# Patient Record
Sex: MALE | Race: WHITE | NOT HISPANIC OR LATINO | Employment: FULL TIME | URBAN - METROPOLITAN AREA
[De-identification: names, ages, dates, MRNs, and addresses within clinical notes are randomized per-mention and may not be internally consistent; named-entity substitution may affect disease eponyms.]

---

## 2021-07-31 ENCOUNTER — HOSPITAL ENCOUNTER (OUTPATIENT)
Facility: HOSPITAL | Age: 41
Setting detail: OBSERVATION
Discharge: HOME | End: 2021-08-02
Attending: EMERGENCY MEDICINE | Admitting: FAMILY MEDICINE
Payer: COMMERCIAL

## 2021-07-31 ENCOUNTER — APPOINTMENT (EMERGENCY)
Dept: CT IMAGING | Facility: HOSPITAL | Age: 41
End: 2021-07-31
Payer: COMMERCIAL

## 2021-07-31 ENCOUNTER — APPOINTMENT (EMERGENCY)
Dept: RADIOLOGY | Facility: HOSPITAL | Age: 41
End: 2021-07-31
Payer: COMMERCIAL

## 2021-07-31 DIAGNOSIS — S82.892A CLOSED LEFT ANKLE FRACTURE: ICD-10-CM

## 2021-07-31 DIAGNOSIS — S82.873A PILON FRACTURE: Primary | ICD-10-CM

## 2021-07-31 PROBLEM — S82.852A CLOSED DISPLACED TRIMALLEOLAR FRACTURE OF LEFT ANKLE: Status: ACTIVE | Noted: 2021-07-31

## 2021-07-31 PROBLEM — E66.3 OVERWEIGHT (BMI 25.0-29.9): Status: ACTIVE | Noted: 2021-07-31

## 2021-07-31 LAB
ABO GROUP BLD: NORMAL
ABO GROUP BLD: NORMAL
ANION GAP SERPL CALCULATED.3IONS-SCNC: 11 MMOL/L (ref 4–13)
APTT PPP: 26 SECONDS (ref 23–37)
BASOPHILS # BLD AUTO: 0 THOUSANDS/ΜL (ref 0–0.1)
BASOPHILS NFR BLD AUTO: 0 % (ref 0–2)
BLD GP AB SCN SERPL QL: NEGATIVE
BUN SERPL-MCNC: 16 MG/DL (ref 7–25)
CALCIUM SERPL-MCNC: 9.1 MG/DL (ref 8.6–10.5)
CHLORIDE SERPL-SCNC: 104 MMOL/L (ref 98–107)
CO2 SERPL-SCNC: 24 MMOL/L (ref 21–31)
CREAT SERPL-MCNC: 0.55 MG/DL (ref 0.7–1.3)
EOSINOPHIL # BLD AUTO: 0 THOUSAND/ΜL (ref 0–0.61)
EOSINOPHIL NFR BLD AUTO: 0 % (ref 0–5)
ERYTHROCYTE [DISTWIDTH] IN BLOOD BY AUTOMATED COUNT: 13.8 % (ref 11.5–14.5)
GFR SERPL CREATININE-BSD FRML MDRD: 131 ML/MIN/1.73SQ M
GLUCOSE P FAST SERPL-MCNC: 86 MG/DL (ref 65–99)
GLUCOSE SERPL-MCNC: 86 MG/DL (ref 65–99)
HCT VFR BLD AUTO: 44.2 % (ref 42–47)
HGB BLD-MCNC: 15 G/DL (ref 14–18)
INR PPP: 1.21 (ref 0.84–1.19)
LYMPHOCYTES # BLD AUTO: 1.3 THOUSANDS/ΜL (ref 0.6–4.47)
LYMPHOCYTES NFR BLD AUTO: 12 % (ref 21–51)
MCH RBC QN AUTO: 26.6 PG (ref 26–34)
MCHC RBC AUTO-ENTMCNC: 34 G/DL (ref 31–37)
MCV RBC AUTO: 78 FL (ref 81–99)
MONOCYTES # BLD AUTO: 1 THOUSAND/ΜL (ref 0.17–1.22)
MONOCYTES NFR BLD AUTO: 10 % (ref 2–12)
NEUTROPHILS # BLD AUTO: 8.4 THOUSANDS/ΜL (ref 1.4–6.5)
NEUTS SEG NFR BLD AUTO: 78 % (ref 42–75)
PLATELET # BLD AUTO: 239 THOUSANDS/UL (ref 149–390)
PMV BLD AUTO: 8.1 FL (ref 8.6–11.7)
POTASSIUM SERPL-SCNC: 3.9 MMOL/L (ref 3.5–5.5)
PROTHROMBIN TIME: 15.2 SECONDS (ref 11.6–14.5)
RBC # BLD AUTO: 5.65 MILLION/UL (ref 4.3–5.9)
RH BLD: POSITIVE
RH BLD: POSITIVE
SODIUM SERPL-SCNC: 139 MMOL/L (ref 134–143)
SPECIMEN EXPIRATION DATE: NORMAL
WBC # BLD AUTO: 10.9 THOUSAND/UL (ref 4.8–10.8)

## 2021-07-31 PROCEDURE — 86900 BLOOD TYPING SEROLOGIC ABO: CPT | Performed by: EMERGENCY MEDICINE

## 2021-07-31 PROCEDURE — 85025 COMPLETE CBC W/AUTO DIFF WBC: CPT | Performed by: EMERGENCY MEDICINE

## 2021-07-31 PROCEDURE — 36415 COLL VENOUS BLD VENIPUNCTURE: CPT | Performed by: EMERGENCY MEDICINE

## 2021-07-31 PROCEDURE — 99220 PR INITIAL OBSERVATION CARE/DAY 70 MINUTES: CPT | Performed by: FAMILY MEDICINE

## 2021-07-31 PROCEDURE — 99285 EMERGENCY DEPT VISIT HI MDM: CPT | Performed by: EMERGENCY MEDICINE

## 2021-07-31 PROCEDURE — 73700 CT LOWER EXTREMITY W/O DYE: CPT

## 2021-07-31 PROCEDURE — 85610 PROTHROMBIN TIME: CPT | Performed by: EMERGENCY MEDICINE

## 2021-07-31 PROCEDURE — 80048 BASIC METABOLIC PNL TOTAL CA: CPT | Performed by: EMERGENCY MEDICINE

## 2021-07-31 PROCEDURE — 73610 X-RAY EXAM OF ANKLE: CPT

## 2021-07-31 PROCEDURE — 85730 THROMBOPLASTIN TIME PARTIAL: CPT | Performed by: EMERGENCY MEDICINE

## 2021-07-31 PROCEDURE — 99285 EMERGENCY DEPT VISIT HI MDM: CPT

## 2021-07-31 PROCEDURE — 86850 RBC ANTIBODY SCREEN: CPT | Performed by: EMERGENCY MEDICINE

## 2021-07-31 PROCEDURE — 93005 ELECTROCARDIOGRAM TRACING: CPT

## 2021-07-31 PROCEDURE — 86901 BLOOD TYPING SEROLOGIC RH(D): CPT | Performed by: EMERGENCY MEDICINE

## 2021-07-31 RX ORDER — ONDANSETRON 2 MG/ML
4 INJECTION INTRAMUSCULAR; INTRAVENOUS EVERY 6 HOURS PRN
Status: DISCONTINUED | OUTPATIENT
Start: 2021-07-31 | End: 2021-08-02 | Stop reason: HOSPADM

## 2021-07-31 RX ORDER — HYDROMORPHONE HCL/PF 1 MG/ML
0.5 SYRINGE (ML) INJECTION ONCE
Status: DISCONTINUED | OUTPATIENT
Start: 2021-07-31 | End: 2021-08-02 | Stop reason: HOSPADM

## 2021-07-31 RX ORDER — OXYCODONE HYDROCHLORIDE 5 MG/1
5 TABLET ORAL ONCE
Status: COMPLETED | OUTPATIENT
Start: 2021-07-31 | End: 2021-07-31

## 2021-07-31 RX ORDER — HYDROMORPHONE HCL/PF 1 MG/ML
1 SYRINGE (ML) INJECTION EVERY 4 HOURS PRN
Status: DISCONTINUED | OUTPATIENT
Start: 2021-07-31 | End: 2021-08-01

## 2021-07-31 RX ADMIN — HYDROMORPHONE HYDROCHLORIDE 1 MG: 1 INJECTION, SOLUTION INTRAMUSCULAR; INTRAVENOUS; SUBCUTANEOUS at 20:47

## 2021-07-31 RX ADMIN — MORPHINE SULFATE 2 MG: 2 INJECTION, SOLUTION INTRAMUSCULAR; INTRAVENOUS at 23:12

## 2021-07-31 RX ADMIN — OXYCODONE HYDROCHLORIDE 5 MG: 5 TABLET ORAL at 18:57
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
## 2021-08-01 ENCOUNTER — ANESTHESIA (OUTPATIENT)
Dept: PERIOP | Facility: HOSPITAL | Age: 41
End: 2021-08-01
Payer: COMMERCIAL

## 2021-08-01 ENCOUNTER — ANESTHESIA EVENT (OUTPATIENT)
Dept: PERIOP | Facility: HOSPITAL | Age: 41
End: 2021-08-01
Payer: COMMERCIAL

## 2021-08-01 ENCOUNTER — APPOINTMENT (OUTPATIENT)
Dept: RADIOLOGY | Facility: HOSPITAL | Age: 41
End: 2021-08-01
Payer: COMMERCIAL

## 2021-08-01 LAB
ATRIAL RATE: 104 BPM
P AXIS: 23 DEGREES
PR INTERVAL: 198 MS
QRS AXIS: 30 DEGREES
QRSD INTERVAL: 104 MS
QT INTERVAL: 358 MS
QTC INTERVAL: 470 MS
T WAVE AXIS: 52 DEGREES
VENTRICULAR RATE: 104 BPM

## 2021-08-01 PROCEDURE — 99225 PR SBSQ OBSERVATION CARE/DAY 25 MINUTES: CPT | Performed by: PHYSICIAN ASSISTANT

## 2021-08-01 PROCEDURE — 20690 APPL UNIPLN UNI EXT FIXJ SYS: CPT | Performed by: PHYSICIAN ASSISTANT

## 2021-08-01 PROCEDURE — C1713 ANCHOR/SCREW BN/BN,TIS/BN: HCPCS | Performed by: ORTHOPAEDIC SURGERY

## 2021-08-01 PROCEDURE — 20690 APPL UNIPLN UNI EXT FIXJ SYS: CPT | Performed by: ORTHOPAEDIC SURGERY

## 2021-08-01 PROCEDURE — 93010 ELECTROCARDIOGRAM REPORT: CPT | Performed by: INTERNAL MEDICINE

## 2021-08-01 PROCEDURE — 99244 OFF/OP CNSLTJ NEW/EST MOD 40: CPT | Performed by: ORTHOPAEDIC SURGERY

## 2021-08-01 PROCEDURE — 76000 FLUOROSCOPY <1 HR PHYS/QHP: CPT

## 2021-08-01 DEVICE — ROD CARBON FIBER 11MM X 350MM: Type: IMPLANTABLE DEVICE | Status: FUNCTIONAL

## 2021-08-01 DEVICE — 5.0MM SELF-DRILLING SCHANZ SCREW 60MM THRD/175MM: Type: IMPLANTABLE DEVICE | Site: LEG | Status: FUNCTIONAL

## 2021-08-01 DEVICE — CLAMP EXFIX LRG COMBINATION MRI SAFE LRG: Type: IMPLANTABLE DEVICE | Status: FUNCTIONAL

## 2021-08-01 DEVICE — 6.0MM TRANSFIXATION PIN 225MM: Type: IMPLANTABLE DEVICE | Site: LEG | Status: FUNCTIONAL

## 2021-08-01 RX ORDER — SODIUM CHLORIDE, SODIUM LACTATE, POTASSIUM CHLORIDE, CALCIUM CHLORIDE 600; 310; 30; 20 MG/100ML; MG/100ML; MG/100ML; MG/100ML
INJECTION, SOLUTION INTRAVENOUS CONTINUOUS PRN
Status: DISCONTINUED | OUTPATIENT
Start: 2021-08-01 | End: 2021-08-01

## 2021-08-01 RX ORDER — PROPOFOL 10 MG/ML
INJECTION, EMULSION INTRAVENOUS AS NEEDED
Status: DISCONTINUED | OUTPATIENT
Start: 2021-08-01 | End: 2021-08-01

## 2021-08-01 RX ORDER — HYDROMORPHONE HCL/PF 1 MG/ML
0.5 SYRINGE (ML) INJECTION
Status: DISCONTINUED | OUTPATIENT
Start: 2021-08-01 | End: 2021-08-01 | Stop reason: HOSPADM

## 2021-08-01 RX ORDER — OXYCODONE HYDROCHLORIDE 10 MG/1
10 TABLET ORAL EVERY 4 HOURS PRN
Status: DISCONTINUED | OUTPATIENT
Start: 2021-08-01 | End: 2021-08-02 | Stop reason: HOSPADM

## 2021-08-01 RX ORDER — ONDANSETRON 2 MG/ML
INJECTION INTRAMUSCULAR; INTRAVENOUS AS NEEDED
Status: DISCONTINUED | OUTPATIENT
Start: 2021-08-01 | End: 2021-08-01

## 2021-08-01 RX ORDER — MAGNESIUM HYDROXIDE 1200 MG/15ML
LIQUID ORAL AS NEEDED
Status: DISCONTINUED | OUTPATIENT
Start: 2021-08-01 | End: 2021-08-01 | Stop reason: HOSPADM

## 2021-08-01 RX ORDER — ACETAMINOPHEN 325 MG/1
650 TABLET ORAL EVERY 6 HOURS PRN
Status: DISCONTINUED | OUTPATIENT
Start: 2021-08-01 | End: 2021-08-02 | Stop reason: HOSPADM

## 2021-08-01 RX ORDER — METOCLOPRAMIDE HYDROCHLORIDE 5 MG/ML
10 INJECTION INTRAMUSCULAR; INTRAVENOUS ONCE AS NEEDED
Status: DISCONTINUED | OUTPATIENT
Start: 2021-08-01 | End: 2021-08-01 | Stop reason: HOSPADM

## 2021-08-01 RX ORDER — CEFAZOLIN SODIUM 2 G/50ML
2000 SOLUTION INTRAVENOUS ONCE
Status: COMPLETED | OUTPATIENT
Start: 2021-08-01 | End: 2021-08-01

## 2021-08-01 RX ORDER — CHLORHEXIDINE GLUCONATE 4 G/100ML
SOLUTION TOPICAL DAILY PRN
Status: DISCONTINUED | OUTPATIENT
Start: 2021-08-01 | End: 2021-08-01 | Stop reason: HOSPADM

## 2021-08-01 RX ORDER — OXYCODONE HYDROCHLORIDE AND ACETAMINOPHEN 5; 325 MG/1; MG/1
2 TABLET ORAL EVERY 4 HOURS PRN
Status: DISCONTINUED | OUTPATIENT
Start: 2021-08-01 | End: 2021-08-01

## 2021-08-01 RX ORDER — HYDROMORPHONE HCL/PF 1 MG/ML
SYRINGE (ML) INJECTION AS NEEDED
Status: DISCONTINUED | OUTPATIENT
Start: 2021-08-01 | End: 2021-08-01

## 2021-08-01 RX ORDER — HYDROMORPHONE HCL/PF 1 MG/ML
0.5 SYRINGE (ML) INJECTION EVERY 4 HOURS PRN
Status: DISCONTINUED | OUTPATIENT
Start: 2021-08-01 | End: 2021-08-02 | Stop reason: HOSPADM

## 2021-08-01 RX ORDER — FENTANYL CITRATE 50 UG/ML
INJECTION, SOLUTION INTRAMUSCULAR; INTRAVENOUS AS NEEDED
Status: DISCONTINUED | OUTPATIENT
Start: 2021-08-01 | End: 2021-08-01

## 2021-08-01 RX ORDER — DEXMEDETOMIDINE HYDROCHLORIDE 100 UG/ML
INJECTION, SOLUTION INTRAVENOUS AS NEEDED
Status: DISCONTINUED | OUTPATIENT
Start: 2021-08-01 | End: 2021-08-01

## 2021-08-01 RX ORDER — LIDOCAINE HYDROCHLORIDE 10 MG/ML
INJECTION, SOLUTION EPIDURAL; INFILTRATION; INTRACAUDAL; PERINEURAL AS NEEDED
Status: DISCONTINUED | OUTPATIENT
Start: 2021-08-01 | End: 2021-08-01

## 2021-08-01 RX ORDER — KETAMINE HYDROCHLORIDE 50 MG/ML
INJECTION, SOLUTION, CONCENTRATE INTRAMUSCULAR; INTRAVENOUS AS NEEDED
Status: DISCONTINUED | OUTPATIENT
Start: 2021-08-01 | End: 2021-08-01

## 2021-08-01 RX ORDER — FENTANYL CITRATE/PF 50 MCG/ML
50 SYRINGE (ML) INJECTION
Status: DISCONTINUED | OUTPATIENT
Start: 2021-08-01 | End: 2021-08-01 | Stop reason: HOSPADM

## 2021-08-01 RX ORDER — CEFAZOLIN SODIUM 1 G/50ML
1000 SOLUTION INTRAVENOUS EVERY 8 HOURS
Status: COMPLETED | OUTPATIENT
Start: 2021-08-01 | End: 2021-08-02

## 2021-08-01 RX ORDER — MIDAZOLAM HYDROCHLORIDE 2 MG/2ML
INJECTION, SOLUTION INTRAMUSCULAR; INTRAVENOUS AS NEEDED
Status: DISCONTINUED | OUTPATIENT
Start: 2021-08-01 | End: 2021-08-01

## 2021-08-01 RX ORDER — OXYCODONE HYDROCHLORIDE 5 MG/1
5 TABLET ORAL EVERY 4 HOURS PRN
Status: DISCONTINUED | OUTPATIENT
Start: 2021-08-01 | End: 2021-08-02 | Stop reason: HOSPADM

## 2021-08-01 RX ADMIN — CEFAZOLIN SODIUM 2000 MG: 2 SOLUTION INTRAVENOUS at 08:33

## 2021-08-01 RX ADMIN — KETAMINE HYDROCHLORIDE 30 MG: 50 INJECTION, SOLUTION INTRAMUSCULAR; INTRAVENOUS at 08:41

## 2021-08-01 RX ADMIN — KETAMINE HYDROCHLORIDE 20 MG: 50 INJECTION, SOLUTION INTRAMUSCULAR; INTRAVENOUS at 09:02

## 2021-08-01 RX ADMIN — Medication 0.4 MCG/KG/HR: at 08:51

## 2021-08-01 RX ADMIN — FENTANYL CITRATE 50 MCG: 50 INJECTION INTRAMUSCULAR; INTRAVENOUS at 08:33

## 2021-08-01 RX ADMIN — DEXMEDETOMIDINE HYDROCHLORIDE 8 MCG: 100 INJECTION, SOLUTION INTRAVENOUS at 08:51

## 2021-08-01 RX ADMIN — FENTANYL CITRATE 50 MCG: 50 INJECTION INTRAMUSCULAR; INTRAVENOUS at 09:02

## 2021-08-01 RX ADMIN — DEXMEDETOMIDINE HYDROCHLORIDE 8 MCG: 100 INJECTION, SOLUTION INTRAVENOUS at 08:46

## 2021-08-01 RX ADMIN — OXYCODONE HYDROCHLORIDE 10 MG: 10 TABLET ORAL at 15:01

## 2021-08-01 RX ADMIN — CEFAZOLIN SODIUM 1000 MG: 1 SOLUTION INTRAVENOUS at 23:32

## 2021-08-01 RX ADMIN — FENTANYL CITRATE 50 MCG: 50 INJECTION, SOLUTION INTRAMUSCULAR; INTRAVENOUS at 10:10

## 2021-08-01 RX ADMIN — HYDROMORPHONE HYDROCHLORIDE 1 MG: 1 INJECTION, SOLUTION INTRAMUSCULAR; INTRAVENOUS; SUBCUTANEOUS at 08:38

## 2021-08-01 RX ADMIN — MIDAZOLAM HYDROCHLORIDE 2 MG: 1 INJECTION, SOLUTION INTRAMUSCULAR; INTRAVENOUS at 08:38

## 2021-08-01 RX ADMIN — OXYCODONE HYDROCHLORIDE AND ACETAMINOPHEN 2 TABLET: 5; 325 TABLET ORAL at 11:39

## 2021-08-01 RX ADMIN — FENTANYL CITRATE 50 MCG: 50 INJECTION, SOLUTION INTRAMUSCULAR; INTRAVENOUS at 10:35

## 2021-08-01 RX ADMIN — MORPHINE SULFATE 2 MG: 2 INJECTION, SOLUTION INTRAMUSCULAR; INTRAVENOUS at 03:22

## 2021-08-01 RX ADMIN — CEFAZOLIN SODIUM 1000 MG: 1 SOLUTION INTRAVENOUS at 17:12

## 2021-08-01 RX ADMIN — PROPOFOL 200 MG: 10 INJECTION, EMULSION INTRAVENOUS at 08:41

## 2021-08-01 RX ADMIN — LIDOCAINE HYDROCHLORIDE 100 MG: 10 INJECTION, SOLUTION EPIDURAL; INFILTRATION; INTRACAUDAL; PERINEURAL at 08:41

## 2021-08-01 RX ADMIN — HYDROMORPHONE HYDROCHLORIDE 1 MG: 1 INJECTION, SOLUTION INTRAMUSCULAR; INTRAVENOUS; SUBCUTANEOUS at 00:58

## 2021-08-01 RX ADMIN — SODIUM CHLORIDE, SODIUM LACTATE, POTASSIUM CHLORIDE, AND CALCIUM CHLORIDE: .6; .31; .03; .02 INJECTION, SOLUTION INTRAVENOUS at 08:33

## 2021-08-01 RX ADMIN — DEXMEDETOMIDINE HYDROCHLORIDE 8 MCG: 100 INJECTION, SOLUTION INTRAVENOUS at 09:03

## 2021-08-01 RX ADMIN — HYDROMORPHONE HYDROCHLORIDE 0.5 MG: 1 INJECTION, SOLUTION INTRAMUSCULAR; INTRAVENOUS; SUBCUTANEOUS at 17:22

## 2021-08-01 RX ADMIN — ONDANSETRON 4 MG: 2 INJECTION INTRAMUSCULAR; INTRAVENOUS at 09:17

## 2021-08-01 RX ADMIN — OXYCODONE HYDROCHLORIDE 10 MG: 10 TABLET ORAL at 20:02

## 2021-08-01 RX ADMIN — HYDROMORPHONE HYDROCHLORIDE 1 MG: 1 INJECTION, SOLUTION INTRAMUSCULAR; INTRAVENOUS; SUBCUTANEOUS at 05:33

## 2021-08-01 RX ADMIN — HYDROMORPHONE HYDROCHLORIDE 0.5 MG: 1 INJECTION, SOLUTION INTRAMUSCULAR; INTRAVENOUS; SUBCUTANEOUS at 22:32

## 2021-08-01 NOTE — NURSING NOTE
Patient returned from ICU after recovery  Patient awake and alert  Left lower extremity pink with non-pitting edema noted to toes  Patient states numbness still continues but has improved  Ice packs placed  Pain 5/10  Percocet administered  Patient eating and tolerating well  Will continue to monitor

## 2021-08-01 NOTE — NURSING NOTE
Pt remains awake, alert,and oriented x4  Pt states that pain to lle has diminished to 5/10  No nausea  Pt states he is still having mild tingling and numbness to lle but it is better  Verbal report to be given to receiving rn, and pt to be transported to [facility]
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
## 2021-08-01 NOTE — PHYSICAL THERAPY NOTE
PT cancel note       08/01/21 1157   PT Last Visit   PT Visit Date 08/01/21   Note Type   Note type Evaluation   Cancel Reasons Patient to operating room   Will continue to follow    Char Ying, PT

## 2021-08-01 NOTE — NURSING NOTE
Pt received to icu #2 for pacu recovery s/p left ankle fracture  Pt currently on 6l simple mask and o2 sat 98%  Placed on monitor and bp cuff  Pt unresponsive to painful stimuli, sternal rub, or loud verbalizations at this time  Assessment as noted in pacu charting  Lungs with b/l rhonchi  NV check to lle with +2 pulsed, skin warm and dry, and swelling noted to toes on left foot  Ice applied to same  7999  Eyes open, pt drowsy, but oriented to person, place, time, and situation  C/o 8/10 left ankle pain, surgical site  Pt medicated as ordered for same  Pt can move toes on both feet  He states he can feel me touching his toes  Pedal pulses +2 b/l  Pt states his lle feels numb and tingly, but he can feel me touch his toes  98% on simple mask

## 2021-08-01 NOTE — ANESTHESIA PREPROCEDURE EVALUATION
Procedure:  APPLICATION EXTERNAL FIXATION DEVICE LOWER EXTREMITY (Left Leg Lower)    Relevant Problems   No relevant active problems        Physical Exam    Airway    Mallampati score: I  TM Distance: >3 FB  Neck ROM: full     Dental   No notable dental hx     Cardiovascular      Pulmonary      Other Findings        Anesthesia Plan  ASA Score- 1     Anesthesia Type- general with ASA Monitors  Additional Monitors:   Airway Plan: LMA  Plan Factors-Exercise tolerance (METS): >4 METS  Chart reviewed  EKG reviewed  Existing labs reviewed  Patient summary reviewed  Patient is not a current smoker  There is medical exclusion for perioperative obstructive sleep apnea risk education  Induction- intravenous  Postoperative Plan- Plan for postoperative opioid use  Informed Consent- Anesthetic plan and risks discussed with patient  I personally reviewed this patient with the CRNA  Discussed and agreed on the Anesthesia Plan with the CRNA  Lisa Her

## 2021-08-01 NOTE — ANESTHESIA POSTPROCEDURE EVALUATION
Post-Op Assessment Note    CV Status:  Stable  Pain Score: 0    Pain management: adequate     Mental Status:  Alert and awake   Hydration Status:  Euvolemic   PONV Controlled:  Controlled   Airway Patency:  Patent      Post Op Vitals Reviewed: Yes      Staff: CRNA         No complications documented      BP  159/74   Temp   98 9   Pulse  82   Resp   20   SpO2   98
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
## 2021-08-01 NOTE — PLAN OF CARE
Problem: PAIN - ADULT  Goal: Verbalizes/displays adequate comfort level or baseline comfort level  Description: Interventions:  - Encourage patient to monitor pain and request assistance  - Assess pain using appropriate pain scale  - Administer analgesics based on type and severity of pain and evaluate response  - Implement non-pharmacological measures as appropriate and evaluate response  - Consider cultural and social influences on pain and pain management  - Notify physician/advanced practitioner if interventions unsuccessful or patient reports new pain  Outcome: Progressing     Problem: DISCHARGE PLANNING  Goal: Discharge to home or other facility with appropriate resources  Description: INTERVENTIONS:  - Identify barriers to discharge w/patient and caregiver  - Arrange for needed discharge resources and transportation as appropriate  - Identify discharge learning needs (meds, wound care, etc )  - Refer to Case Management Department for coordinating discharge planning if the patient needs post-hospital services based on physician/advanced practitioner order or complex needs related to functional status, cognitive ability, or social support system  Outcome: Progressing     Problem: MUSCULOSKELETAL - ADULT  Goal: Maintain or return mobility to safest level of function  Description: INTERVENTIONS:  - Assess patient's ability to carry out ADLs; assess patient's baseline for ADL function and identify physical deficits which impact ability to perform ADLs (bathing, care of mouth/teeth, toileting, grooming, dressing, etc )  - Assess/evaluate cause of self-care deficits   - Assess range of motion  - Assess patient's mobility  - Assess patient's need for assistive devices and provide as appropriate  - Encourage maximum independence but intervene and supervise when necessary  - Involve family in performance of ADLs  - Assess for home care needs following discharge   - Consider OT consult to assist with ADL evaluation and planning for discharge  - Provide patient education as appropriate  Outcome: Progressing  Goal: Maintain proper alignment of affected body part  Description: INTERVENTIONS:  - Support, maintain and protect limb and body alignment  - Provide patient/ family with appropriate education  Outcome: Progressing
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
## 2021-08-01 NOTE — OCCUPATIONAL THERAPY NOTE
OccupationalTherapy Cancellation Note     Patient Name: Violette Grayson  [id number] Date: 8/1/2021  Problem List  Principal Problem:    Closed displaced trimalleolar fracture of left ankle  Active Problems:    Overweight (BMI 25 0-29 9)          08/01/21 0752   OT Last Visit   OT Visit Date 08/01/21   Note Type   Note type Evaluation   Cancel Reasons Patient to operating room       OT order received and chart review performed  At this time, OT evaluation cancelled due to patient pending OR  OT will follow and evaluate as appropriate        Husam Azul MS, OTR/L

## 2021-08-01 NOTE — OP NOTE
OPERATIVE REPORT  PATIENT NAME: Hilda Lewis    :  1980  MRN: [id number]  Pt Location: [address] [place] [address] OR ROOM 03    SURGERY DATE: 2021    Surgeon(s) and Role:     * Brad Causey MD - Primary     * Jocelin Hatch PA-C - Assisting no qualified resident available, physician assistant helped medically necessary to reduce this extremely difficult peel on fracture with application of external fixator in a safe position    Preop Diagnosis:  Pilon fracture [S82 873A]    Post-Op Diagnosis Codes:     * Pilon fracture [S82 873A]    Procedure(s) (LRB):  APPLICATION EXTERNAL FIXATION DEVICE LOWER EXTREMITY (Left)    Specimen(s):  * No specimens in log *    Estimated Blood Loss:   Minimal    Drains:  * No LDAs found *    Anesthesia Type:   General    Operative Indications:  Pilon fracture [S82 873A]  Comminuted distal tibia fracture including posterior anterior and medial malleolus and comminuted distal fibular fracture    Operative Findings:  Pilon fracture with the articular surface driven up fracture of the posterior malleolus fracture with medial malleolus fracture of the lateral malleolus  Closed reduction performed with application of external fixator and ligamental taxis final check radiographs AP lateral satisfactory    Complications:   None    Procedure and Technique: All treatment options were discussed with the patient including non-operative and operative treatment options  Patient has failed non-perative treatment and has opted for surgical intervention  Risks, complications and benefits of all treatment options were discussed in detail  The risks of surgical intervention including infection, injury to vessels and nerves  risk of failure to achieve desired results, risk of need for further procedures, potential risk of loss of life and limb were discussed with the patient  Informed consent was obtained from the patient  The operative site was marked and signed  A timeout was performed prior to the procedure  The patient was re-identified ,including name, date of birth, procedure, consent form reviewed, site and laterality  Appropriate antibiotics were administered preoperatively    The Physician Assistant was present for the entire case and provided essential assistance with patient positioning, prep and draping, wound closure, sterile dressing and splint application, all under my direct supervision(there was no resident available to assist with this case)    Implant Name Type Inv  Item Serial No   Lot No  LRB No  Used Action   WILFRID PIN TRANSFIX 225 MM - [id number]  WILFRID PIN TRANSFIX 225 MM  Synthes  Left 1 Implanted   SCREW SCHANZ 5 X 175MM THRD 60MM SLF DRILL - [id number]  SCREW SCHANZ 5 X 175MM THRD 60MM SLF DRILL  Synthes  Left 2 Implanted     Patient positioned supine on the operating table with all bony problems well padded left leg was prepared and draped in sterile fashion  A calcaneal pin was inserted good position in AP and lateral views after being cognizant of the neurovascular structures  Two pins were placed in the proximal tibia    The external fixator frame was then applied fracture was reduced in AP and lateral views for the talus to be under the comminuted shattered distal tibial surface radiographs acceptable in AP and lateral views and all the external fixator components were tightened final check radiographs satisfactory   I was present for the entire procedure    Patient Disposition:  PACU     SIGNATURE: Tori Russell MD  DATE: August 1, 2021  TIME: 9:23 AM
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
## 2021-08-02 VITALS
OXYGEN SATURATION: 97 % | DIASTOLIC BLOOD PRESSURE: 73 MMHG | HEIGHT: 67 IN | SYSTOLIC BLOOD PRESSURE: 150 MMHG | RESPIRATION RATE: 18 BRPM | WEIGHT: 196.87 LBS | TEMPERATURE: 97.1 F | HEART RATE: 87 BPM | BODY MASS INDEX: 30.9 KG/M2

## 2021-08-02 LAB
ANION GAP SERPL CALCULATED.3IONS-SCNC: 9 MMOL/L (ref 4–13)
BUN SERPL-MCNC: 13 MG/DL (ref 7–25)
CALCIUM SERPL-MCNC: 9.1 MG/DL (ref 8.6–10.5)
CHLORIDE SERPL-SCNC: 99 MMOL/L (ref 98–107)
CO2 SERPL-SCNC: 28 MMOL/L (ref 21–31)
CREAT SERPL-MCNC: 0.5 MG/DL (ref 0.7–1.3)
ERYTHROCYTE [DISTWIDTH] IN BLOOD BY AUTOMATED COUNT: 13.2 % (ref 11.5–14.5)
GFR SERPL CREATININE-BSD FRML MDRD: 137 ML/MIN/1.73SQ M
GLUCOSE SERPL-MCNC: 110 MG/DL (ref 65–99)
HCT VFR BLD AUTO: 41.3 % (ref 42–47)
HGB BLD-MCNC: 14.2 G/DL (ref 14–18)
MCH RBC QN AUTO: 26.9 PG (ref 26–34)
MCHC RBC AUTO-ENTMCNC: 34.4 G/DL (ref 31–37)
MCV RBC AUTO: 78 FL (ref 81–99)
PLATELET # BLD AUTO: 214 THOUSANDS/UL (ref 149–390)
PMV BLD AUTO: 8.9 FL (ref 8.6–11.7)
POTASSIUM SERPL-SCNC: 4 MMOL/L (ref 3.5–5.5)
RBC # BLD AUTO: 5.27 MILLION/UL (ref 4.3–5.9)
SODIUM SERPL-SCNC: 136 MMOL/L (ref 134–143)
WBC # BLD AUTO: 9.7 THOUSAND/UL (ref 4.8–10.8)

## 2021-08-02 PROCEDURE — 99024 POSTOP FOLLOW-UP VISIT: CPT | Performed by: PHYSICIAN ASSISTANT

## 2021-08-02 PROCEDURE — 97166 OT EVAL MOD COMPLEX 45 MIN: CPT

## 2021-08-02 PROCEDURE — 97162 PT EVAL MOD COMPLEX 30 MIN: CPT

## 2021-08-02 PROCEDURE — 99217 PR OBSERVATION CARE DISCHARGE MANAGEMENT: CPT | Performed by: HOSPITALIST

## 2021-08-02 PROCEDURE — 80048 BASIC METABOLIC PNL TOTAL CA: CPT | Performed by: ORTHOPAEDIC SURGERY

## 2021-08-02 PROCEDURE — 85027 COMPLETE CBC AUTOMATED: CPT | Performed by: PHYSICIAN ASSISTANT

## 2021-08-02 RX ORDER — BACITRACIN, NEOMYCIN, POLYMYXIN B 400; 3.5; 5 [USP'U]/G; MG/G; [USP'U]/G
1 OINTMENT TOPICAL DAILY
Status: DISCONTINUED | OUTPATIENT
Start: 2021-08-02 | End: 2021-08-02 | Stop reason: HOSPADM

## 2021-08-02 RX ORDER — OXYCODONE HYDROCHLORIDE 10 MG/1
10 TABLET ORAL EVERY 4 HOURS PRN
Qty: 30 TABLET | Refills: 0 | Status: SHIPPED | OUTPATIENT
Start: 2021-08-02 | End: 2021-08-12

## 2021-08-02 RX ADMIN — HYDROMORPHONE HYDROCHLORIDE 0.5 MG: 1 INJECTION, SOLUTION INTRAMUSCULAR; INTRAVENOUS; SUBCUTANEOUS at 05:35

## 2021-08-02 RX ADMIN — OXYCODONE HYDROCHLORIDE 5 MG: 5 TABLET ORAL at 08:59

## 2021-08-02 RX ADMIN — OXYCODONE HYDROCHLORIDE 10 MG: 10 TABLET ORAL at 01:56

## 2021-08-02 RX ADMIN — ENOXAPARIN SODIUM 40 MG: 100 INJECTION SUBCUTANEOUS at 08:49

## 2021-08-02 RX ADMIN — HYDROMORPHONE HYDROCHLORIDE 0.5 MG: 1 INJECTION, SOLUTION INTRAMUSCULAR; INTRAVENOUS; SUBCUTANEOUS at 12:18

## 2021-08-02 RX ADMIN — BACITRACIN ZINC, NEOMYCIN SULFATE, AND POLYMYXIN B SULFATE 1 LARGE APPLICATION: 400; 3.5; 5 OINTMENT TOPICAL at 12:17
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
## 2021-08-02 NOTE — NURSING NOTE
Pt presented with moderate sanguinous discharge through the reinforced ABD and anum  Dressing changed with new ABD's and anum wrap  Will continue to monitor closely  Pt has call bell and belongings in reach

## 2021-08-02 NOTE — OCCUPATIONAL THERAPY NOTE
Occupational Therapy Evaluation      Rosemary Lawson    8/2/2021    Patient Active Problem List   Diagnosis    Closed displaced trimalleolar fracture of left ankle          08/02/21 0948   OT Last Visit   OT Visit Date 08/02/21   Note Type   Note type Evaluation   Restrictions/Precautions   Weight Bearing Precautions Per Order Yes   LLE Weight Bearing Per Order NWB   Other Precautions Fall Risk;Pain   Pain Assessment   Pain Assessment Tool 0-10   Pain Score 5   Pain Location/Orientation Orientation: Left; Location: Leg   Pain Onset/Description Onset: Ongoing;Frequency: Constant/Continuous; Descriptor: [address] Pain Intervention(s) Ambulation/increased activity;Repositioned;Elevated   Home Living   Type of Home Apartment  (2nd floor apartment)   Home Layout One level;Performs ADLs on one level; Able to live on main level with bedroom/bathroom;Stairs to enter with rails  (10-15 NIKOLE)   Bathroom Shower/Tub Tub/shower unit   Bathroom Toilet Standard   Bathroom Equipment   (no DME reported at baseline )   [address]   (no AD used at baseline )   Additional Comments Pt reports he will be staying at mother's home for 1 wk which is 1 level w/ 1 NIKOLE w/ rails and 1 additional step to get into bedroom     Prior Function   Level of [place] Independent with ADLs and functional mobility   Lives With Spouse   Receives Help From Family   ADL Assistance Independent   IADLs Independent   Falls in the last 6 months 0   Vocational Full time employment   Comments (+) driving    Lifestyle   Autonomy Patient reporting being independent with ADLs/IADLs, ambulatory with no AD and lives with wife in a one level apartment    Reciprocal Relationships wife and mother    Service to Others works full-time    ADL   Eating Assistance 7  Independent   Grooming Assistance 7  [facility] [facility]  [address] [facility]  [address]  7  Assistance 7  Independent   LB Dressing Assistance 5  Supervision/Setup   Toileting Assistance  5  Supervision/Setup   Bed Mobility   Supine to Sit 6  Modified independent   Additional items HOB elevated; Bedrails   Sit to Supine   (DNT: pt seated OOB in recliner at end of eval )   Transfers   Sit to Stand 5  Supervision   Additional items Assist x 1;Verbal cues   Stand to Sit 5  Supervision   Additional items Assist x 1; Armrests; Verbal cues   Functional Mobility   Functional Mobility 5  Supervision   Additional Comments Pt ambulated in hallway with no overt LOB or SOB  Pt able to maintain NWB status 100% of the time with walker  Additional items Standard walker   Balance   Static Sitting Normal   Dynamic Sitting Normal   Static Standing Fair +   Dynamic Standing Fair   Activity Tolerance   Activity Tolerance Patient limited by pain   Medical Staff Made Aware Pt seen as a co-eval with PT due to the patient's co-morbidities, clinically unstable presentation, and present impairments which are a regression from the patient's baseline  Nurse Made Aware OFELIA Strong made aware of outcomes    RUE Assessment   RUE Assessment WNL   LUE Assessment   LUE Assessment WNL   Hand Function   Gross Motor Coordination Functional   Fine Motor Coordination Functional   Sensation   Light Touch Partial deficits in the LLE  (acute numbness in L foot reported)   Vision-Basic Assessment   Current Vision Does not wear glasses   Cognition   Overall Cognitive Status WFL   Arousal/Participation Alert; Responsive; Cooperative   Attention Within functional limits   Orientation Level Oriented X4   Memory Within functional limits   Following Commands Follows all commands and directions without difficulty   Comments Pt agreeable to OT eval    Assessment   Prognosis Good   Assessment Pt is a 46 y o  male who was admitted to [address] [place] [address] on 7/31/2021 with Closed displaced trimalleolar fracture of left ankle   Additionally, patient is affected by the following personal factors:steps to enter environment  Orders placed for OT evaluation/treatment with up as tolerated orders  Prior to admission, Patient reporting being independent with ADLs/IADLs, ambulatory with no AD and lives with wife in a one level apartment  Upon OT evaluation, patient requires modified independent  for UB ADLs and supervision/set-up for LB ADLs  Patient presents with functional use of BUEs, with intact prehension and fine motor coordination, and symmetrical muscle strength  Patient appears to be functioning at baseline, no further Acute OT needs identified at this time to warrant OT services  D/C OT and from OT standpoint, recommendation at time of d/c would be Home with outpatient rehabilitation once cleared by ortho  Goals   Patient Goals to recover quickly    Plan   OT Frequency Eval only   Recommendation   OT Discharge Recommendation Home with outpatient rehabilitation  (as cleared by ortho )   OT - OK to Discharge Yes  (Once medically cleared)   Additional Comments  At end of eval, pt seated OOB in recliner with all needs met and call bell within reach  Additional Comments 2 The patient's raw score on the AM-PAC Daily Activity inpatient short form is 21, standardized score is 44 27, greater than 39 4  Patients at this level are likely to benefit from discharge to home  Please refer to the recommendation of the Occupational Therapist for safe discharge planning     AM-PAC Daily Activity Inpatient   Lower Body Dressing 3   Bathing 3   Toileting 3   Upper Body Dressing 4   Grooming 4   Eating 4   Daily Activity Raw Score 21   Daily Activity Standardized Score (Calc for Raw Score >=11) 44 27   AM-PAC Applied Cognition Inpatient   Following a Speech/Presentation 4   Understanding Ordinary Conversation 4   Taking Medications 4   Remembering Where Things Are Placed or Put Away 4   Remembering List of 4-5 Errands 4   Taking Care of Complicated Tasks 4   Applied Cognition Raw Score 24   Applied Cognition Standardized Score 62 21     Jody Rodriguez, OT

## 2021-08-02 NOTE — DISCHARGE INSTRUCTIONS
Pin site care daily patient might need [facility] help for this or he can do it himself  Patient needs to follow up with Dr Torres Martinez in [place] a copy of all the x-rays CT scan should be given to the patient to take for follow-up in [place]  He needs to see orthopedics a within a week for further definitive fixation and removal of external fixator    [facility]   [address] [place] [address],[address]        Pain Management   WHAT YOU NEED TO KNOW:   Pain management includes medicines and therapies to treat pain from a surgery, injury, or illness  This can help increase your appetite, sleep, and energy  It can also improve your mood and your relationships  You and your family will receive information about how to manage your pain at home  The instructions will include what to do if you have side effects as your pain is managed  It will also include how to handle prescription pain medicine safely if it is prescribed  It is important to follow all instructions so your pain is managed effectively  This will help prevent a return to the hospital   DISCHARGE INSTRUCTIONS:   Call your doctor or pain specialist if:   · You continue to have breakthrough pain (pain between times of taking your medicine)  · The medicine you are taking makes you sleepier than usual or confused  · You have pain even after you take your pain medicine  · You have a new pain or the pain seems different than before  · You have constipation from prescription pain medicine that is not helped with treatment  · You have questions or concerns about your condition or care  Medicines:  Pain medicine may be given in any of the following ways, depending on the kind of pain you have:  · Over-the-counter:      ? NSAIDs , such as ibuprofen, help decrease swelling, pain, and fever  NSAIDs can cause stomach bleeding or kidney problems in certain people   If you take blood thinner medicine, always ask if NSAIDs are safe for you  Always read the medicine label and follow directions  Do not give these medicines to children under 10months of age without direction from your child's healthcare provider  ? Acetaminophen  decreases pain and fever  It is available without a doctor's order  Ask how much to take and how often to take it  Follow directions  Read the labels of all other medicines you are using to see if they also contain acetaminophen, or ask your doctor or pharmacist  Acetaminophen can cause liver damage if not taken correctly  Do not use more than 4 grams (4,000 milligrams) total of acetaminophen in one day  ? A pain cream, gel, or patch  may be applied to your skin on painful areas  · Prescription:      ? Several kinds of prescription pain medicines  are available  An example is opioid, or narcotic, pain medicine  Prescription pain medicines may be used for short-term (acute) pain  These medicines may not be given for long-term (chronic) pain control  Ask your healthcare provider how to take your specific prescription pain medicine safely  Also, some prescription pain medicines contain acetaminophen  Do not take other medicines that contain acetaminophen without talking to your healthcare provider  Too much acetaminophen may cause liver damage  ? Muscle relaxers  help decrease pain and muscle spasms  ? Steroids  decrease inflammation that causes pain  ? Anesthetic  medicines may be injected in or around a nerve to block pain signals from the nerves  ? Anxiety medicine  decreases anxiety  High levels of anxiety make pain harder to manage  ? Antidepressants  may be used to help decrease or prevent the symptoms of depression or anxiety  They are also used to treat nerve pain  ? Anticonvulsants  are usually used to control seizures  They may also be used to decrease chronic pain  · Take your medicine as directed    Contact your healthcare provider if you think your medicine is not helping or if you have side effects  Tell him or her if you are allergic to any medicine  Keep a list of the medicines, vitamins, and herbs you take  Include the amounts, and when and why you take them  Bring the list or the pill bottles to follow-up visits  Carry your medicine list with you in case of an emergency  Prescription pain medicine safety:   · Do not suddenly stop taking prescription pain medicine  If you have been taking prescription pain medicine for longer than 2 weeks, a sudden stop may cause dangerous side effects  Ask your healthcare provider for more information before you stop taking your medicine  · Take your medicine as directed  Take only the amount prescribed or recommended by your healthcare provider  Too much medicine may cause breathing problems or other health issues  If you use a pain patch, be sure to remove the old patch before you place a new one  · Do not drink alcohol while you use prescription medicines  Alcohol with prescription medicines can make you sleepy and slow your breathing rate  You may stop breathing completely  · Do not drive or operate heavy machinery after you take prescription pain medicine  Prescription pain medicine can make you drowsy and make it hard to concentrate  You may injure yourself or others if you drive or operate heavy machinery while taking your medicine  · Time your medicine correctly  Take your pain medicine 30 minutes before exercise or physical therapy  This helps decrease pain to help meet your treatment goals  You may need to take medicine before you go to bed  This may help you sleep and not be woken by pain  · Watch for side effects  Some foods, alcohol, and other medicines may cause side effects when you take pain medicine  Ask your healthcare provider how to prevent these problems  · Prevent constipation  This is a common side effect of prescription pain medicine   Eat foods high in fiber, such as raw fruit, vegetables, beans, and whole-grain bread and cereal  Ask your healthcare provider how much liquid to drink each day and which liquids are best for you  Exercise and activity may also help decrease the risk for constipation  · Follow instructions for what to do with medicine you do not use  Your healthcare provider will give you instructions for how to dispose of pain medicine safely  This helps make sure no one else takes the medicine  Ways pain may be managed without medicine:   · Massage therapy helps relieve tight muscles  This may help you relax and decrease pain  · Ultrasound can help decrease pain  Ultrasound is a procedure that uses sound waves to create heat applied to muscles  · Acupuncture helps reduce pain and other symptoms  Thin needles are used to balance energy channels in the body  · Biofeedback helps your body respond differently to pain  You will learn what your breathing and heart rate are when you are relaxed  That will help you get your breathing and heart rate to those levels when you are in pain  · Electrical stimulation may be used to control pain  Transcutaneous electrical stimulations (TENS) is a portable device that attaches to your skin  It uses mild, safe electrical signals to help control pain  Spinal cord stimulation (SCS) is a procedure that uses a metal wire placed near your spinal cord to help control pain  SCS also uses mild, safe electrical signals  The SCS is placed during surgery  · Surgery and other procedures may help relieve pain  Examples include radio waves, thermal (heat), or laser therapy  Surgery may also include cutting nerves or repairing joints that are the cause of your chronic pain  What you can do to manage pain:  The following may be helpful if you have mild pain or pain between medicine doses:  · Apply heat or ice as directed  Heat also relieves muscle spasms  Ice may help prevent tissue damage   Your healthcare provider may recommend only heat or ice, or you may be told to alternate  For heat, use a heat pack, heating pad, or a warm washcloth  The temperature should not be hot enough to burn your skin  Apply heat for 20 to 30 minutes every 2 hours for as many days as directed  For ice, use an ice pack, or put crushed ice in a plastic bag  Cover it with a towel before you place it on your skin  Apply ice for 15 to 20 minutes every hour or as directed  · Elevate the painful area above the level of your heart if possible  This will help decrease swelling and pain  Prop your painful area on pillows or blankets to keep it elevated comfortably  · Apply compression with an elastic bandage or abdominal binder as directed  An elastic bandage may be used after surgery on your joint, such as your knee  An abdominal binder may be used for surgeries in your abdomen  · Use devices to help you move and decrease pain  Devices can help remove pressure from the injury and provide extra support  Assistive devices include a splint, cane, crutches, or a walker  Knee sleeves and braces help decrease pain by giving your knees extra support  Arch supports and orthotics are devices that are put in your shoes to help you stand, walk, or run correctly  · Aromatherapy uses scents to relax, relieve stress, and decrease pain  Oils, extracts, or fragrances from flowers, herbs, and trees may be used  They may be inhaled or used during massages, facials, body wraps, and baths  · Meditation teaches you how to focus inside yourself  The goal of meditation is to help you feel more calm and peaceful  · Guided imagery teaches you to imagine a picture in your mind  You learn to focus on the picture instead of your pain  It may help you learn how to change the way your body senses and responds to pain  · Music may help increase energy levels and improve your mood  It may help reduce pain by triggering your body to release endorphins  These are natural body chemicals that decrease pain  Music may be used with any of the other techniques, such as relaxation and distraction  · Self-hypnosis is a way to direct your attention to something other than your pain  For example, you might repeat a positive statement about ignoring the pain or seeing the pain in a positive way  What else you can do to manage pain:   · Keep a pain diary  A pain diary may help track pain cycles so you know when and how your pain starts and ends  Include anything that makes your pain worse or better  Bring the pain diary to follow-up visits with your healthcare provider  · Talk to your healthcare provider about your daily activities  Some activities can cause pain to become worse or make pain management less effective  Your provider can help you find ways to reduce pain  For example, you may need to change when you take your pain medicine so it is more effective during activities  · Sleep in a comfortable position  Use pillows to support painful areas  · Go to rehabilitation as directed  Rehabilitation may include physical and occupational therapy  A physical therapist teaches you exercises to help improve movement and strength, and to decrease pain  An occupational therapist teaches you skills to help with your daily activities  · Exercise to help relieve pain and increase your energy  Talk to your healthcare provider about how much exercise to get each day and which exercises are best for you  Follow up with your doctor or pain specialist as directed:  Talk to your provider about your pain management at home  Tell him or her if you are able to do more of your daily activities or if any activity still causes pain  Your provider may want to make changes to your pain medicine or refer you to a specialist  For example, an occupational therapist can help you find new ways to do your daily activities so you have less pain   Write down your questions so you remember to ask them during your visits  © Copyright [employer] 2021 Information is for End User's use only and may not be sold, redistributed or otherwise used for commercial purposes  All illustrations and images included in CareNotes® are the copyrighted property of [employer]  or Fern Salamanca  The above information is an  only  It is not intended as medical advice for individual conditions or treatments  Talk to your doctor, nurse or pharmacist before following any medical regimen to see if it is safe and effective for you  Oxycodone, Rapid Release (By mouth)   Oxycodone Hydrochloride ([id number]-done [id number])  Treats moderate to severe pain  This medicine is a narcotic pain reliever  Brand Name(s): Oxaydo, Oxy IR, Roxicodone   There may be other brand names for this medicine  When This Medicine Should Not Be Used: This medicine is not right for everyone  Do not use it if you had an allergic reaction to oxycodone, codeine, hydrocodone, dihydrocodeine, or morphine, or if you have severe lung or breathing problems, or stomach or bowel blockage (including paralytic ileus)  How to Use This Medicine:   Capsule, Liquid, Tablet  · Take your medicine as directed  Your dose may need to be changed several times to find what works best for you  An overdose can be dangerous  Follow directions carefully so you do not get too much medicine at one time  Your doctor may also give naloxone to treat an overdose  · Oral liquid: Measure the oral liquid medicine with a marked measuring spoon, oral syringe, or medicine cup  · Oxaydo® tablet: Swallow it whole with enough water to swallow it completely  Do not break, crush, chew, or dissolve it  Do not wet the tablet before you put it in your mouth  · While taking the Roxybond tablet, part of it may pass into your stool  This is normal and nothing to worry about  · This medicine should come with a Medication Guide   Ask your pharmacist for a copy if you do not have one  · Missed dose: Take a dose as soon as you remember  If it is almost time for your next dose, wait until then and take a regular dose  Do not take extra medicine to make up for a missed dose  If you miss a dose of Roxybond or Roxicodone®, skip the missed dose and go back to your regular dosing schedule  · Store the medicine in a closed container at room temperature, away from heat, moisture, and direct light  Store the medicine in a secure place to prevent others from getting it  Drop off any unused narcotic medicine at a drug take-back location right away  If you do not have a drug take-back location near you, flush any unused narcotic medicine down the toilet  Check your local drug store and clinics for take-back locations  You can also check the [employer] web site for locations  Here is the link to the FDA safe disposal of medicines website: www fda gov/drugs/resourcesforyou/consumers/buyingusingmedicinesafely/ensuringsafeuseofmedicine/safedisposalofmedicines/cbg317307 htm  Drugs and Foods to Avoid:   Ask your doctor or pharmacist before using any other medicine, including over-the-counter medicines, vitamins, and herbal products  · Do not use this medicine if you are using or have used an MAO inhibitor within the past 14 days  · Some medicines can affect how oxycodone works  Tell your doctor if you are using any of the following:  ? Amiodarone, carbamazepine, cyclobenzaprine, erythromycin, ketoconazole, metaxalone, mirtazapine, phenytoin, quinidine, rifampin, ritonavir, tramadol, trazodone  ? Benzodiazepine medicine  ? Diuretic (water pill)  ? Medicine to treat depression or mental health problems (including SNRIs, SSRIs, TCAs)  ? Medicine to treat migraine headaches  ? Phenothiazine medicine  · Tell your doctor if you use anything else that makes you sleepy  Some examples are allergy medicine, narcotic pain medicine, and alcohol   Tell your doctor if you are also using buprenorphine, butorphanol, nalbuphine, pentazocine, or a muscle relaxer  · Do not drink alcohol while you are using this medicine  Warnings While Using This Medicine:   · Tell your doctor if you are pregnant or breastfeeding, or if you have kidney disease, liver disease, heart disease, low blood pressure, lung disease or breathing problems (including asthma, COPD, sleep apnea), scoliosis, an enlarged prostate, trouble urinating or swallowing, thyroid problems, Washburn disease, gallbladder or pancreas problems, or digestion problems  Tell your doctor if you have a history of head injury, brain tumor, mental health problems, seizures, or alcohol or drug addiction  · This medicine may cause the following problems:  ? High risk of overdose, which can lead to death  ? Respiratory depression (serious breathing problem that can be life-threatening)  ? Sleep-related breathing problems (including sleep apnea, sleep-related hypoxemia)  ? Adrenal gland problems  ? Low blood pressure  ? Serotonin syndrome, when used with certain medicines  ? Seizures  · This medicine may make you dizzy, drowsy, or lightheaded  Do not drive or do anything else that could be dangerous until you know how this medicine affects you  Sit or lie down if you feel dizzy  Stand up carefully  · This medicine can be habit-forming  Do not use more than your prescribed dose  Call your doctor if you think your medicine is not working  · Do not stop using this medicine suddenly  Your doctor will need to slowly decrease your dose before you stop it completely  · This medicine may cause constipation, especially with long-term use  Ask your doctor if you should use a laxative to prevent and treat constipation  Drink plenty of liquids to help avoid constipation  · This medicine could cause infertility  Talk with your doctor before using this medicine if you plan to have children  · Keep all medicine out of the reach of children   Never share your medicine with anyone  Possible Side Effects While Using This Medicine:   Call your doctor right away if you notice any of these side effects:  · Allergic reaction: Itching or hives, swelling in your face or hands, swelling or tingling in your mouth or throat, chest tightness, trouble breathing  · Anxiety, restlessness, fast heartbeat, fever, sweating, muscle spasms, twitching, nausea, vomiting, diarrhea, seeing or hearing things that are not there  · Blue lips, fingernails, or skin, trouble breathing  · Changes in skin color, dark freckles, cold feeling, tiredness, weight loss  · Extreme dizziness or weakness, shallow breathing, slow heartbeat, sweating, cold or clammy skin, seizures  · Lightheadedness, dizziness, fainting  If you notice these less serious side effects, talk with your doctor:   · Constipation, stomach pain  · Sleepiness  If you notice other side effects that you think are caused by this medicine, tell your doctor  Call your doctor for medical advice about side effects  You may report side effects to FDA at 9-279-FDA-2242  © Copyright [address] 2021 Information is for End User's use only and may not be sold, redistributed or otherwise used for commercial purposes  The above information is an  only  It is not intended as medical advice for individual conditions or treatments  Talk to your doctor, nurse or pharmacist before following any medical regimen to see if it is safe and effective for you
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
## 2021-08-02 NOTE — NURSING NOTE
Van callejas was in to see and eval the pt, ok to do pin care, aru nurses down to assist with the pin care as well as teach the pt to do his own care at home, refused hhc, pt tolerated well and verbalized understanding, extra supplies sent with the pt to assist with the care, dr Qureshi Parents in to see and eval the pt and he is ok for dc to home, iv site removed with the tip intact, avs reviewed with the pt, all questions answered to his satisfaction, pt assisted into a wc, taken to family car and was dcd from the hospital

## 2021-08-02 NOTE — PLAN OF CARE
Problem: PAIN - ADULT  Goal: Verbalizes/displays adequate comfort level or baseline comfort level  Description: Interventions:  - Encourage patient to monitor pain and request assistance  - Assess pain using appropriate pain scale  - Administer analgesics based on type and severity of pain and evaluate response  - Implement non-pharmacological measures as appropriate and evaluate response  - Consider cultural and social influences on pain and pain management  - Notify physician/advanced practitioner if interventions unsuccessful or patient reports new pain  8/2/2021 1249 by Tabitha Mcgraw RN  Outcome: Adequate for Discharge  8/2/2021 0734 by Tabitha Mcgraw RN  Outcome: Progressing  8/2/2021 0734 by Tabitha Mcgraw RN  Outcome: Progressing     Problem: DISCHARGE PLANNING  Goal: Discharge to home or other facility with appropriate resources  Description: INTERVENTIONS:  - Identify barriers to discharge w/patient and caregiver  - Arrange for needed discharge resources and transportation as appropriate  - Identify discharge learning needs (meds, wound care, etc )  - Refer to Case Management Department for coordinating discharge planning if the patient needs post-hospital services based on physician/advanced practitioner order or complex needs related to functional status, cognitive ability, or social support system  8/2/2021 1249 by Tabitha Mcgraw RN  Outcome: Adequate for Discharge  8/2/2021 0734 by Tabitha Mcgraw RN  Outcome: Progressing  8/2/2021 0734 by Tabitha Mcgraw RN  Outcome: Progressing     Problem: MUSCULOSKELETAL - ADULT  Goal: Maintain or return mobility to safest level of function  Description: INTERVENTIONS:  - Assess patient's ability to carry out ADLs; assess patient's baseline for ADL function and identify physical deficits which impact ability to perform ADLs (bathing, care of mouth/teeth, toileting, grooming, dressing, etc )  - Assess/evaluate cause of self-care deficits   - Assess range of motion  - Assess patient's mobility  - Assess patient's need for assistive devices and provide as appropriate  - Encourage maximum independence but intervene and supervise when necessary  - Involve family in performance of ADLs  - Assess for home care needs following discharge   - Consider OT consult to assist with ADL evaluation and planning for discharge  - Provide patient education as appropriate  8/2/2021 1249 by Tabitha Mcgraw RN  Outcome: Adequate for Discharge  8/2/2021 0734 by Tabitha Mcgraw RN  Outcome: Progressing  8/2/2021 0734 by Tabitha Mcgraw RN  Outcome: Progressing  Goal: Maintain proper alignment of affected body part  Description: INTERVENTIONS:  - Support, maintain and protect limb and body alignment  - Provide patient/ family with appropriate education  8/2/2021 1249 by Tabitha Mcgraw RN  Outcome: Adequate for Discharge  8/2/2021 0734 by Tabitha Mcgraw RN  Outcome: Progressing  8/2/2021 0734 by Tabitha Mcgraw RN  Outcome: Progressing     Problem: MOBILITY - ADULT  Goal: Maintain or return to baseline ADL function  Description: INTERVENTIONS:  -  Assess patient's ability to carry out ADLs; assess patient's baseline for ADL function and identify physical deficits which impact ability to perform ADLs (bathing, care of mouth/teeth, toileting, grooming, dressing, etc )  - Assess/evaluate cause of self-care deficits   - Assess range of motion  - Assess patient's mobility; develop plan if impaired  - Assess patient's need for assistive devices and provide as appropriate  - Encourage maximum independence but intervene and supervise when necessary  - Involve family in performance of ADLs  - Assess for home care needs following discharge   - Consider OT consult to assist with ADL evaluation and planning for discharge  - Provide patient education as appropriate  8/2/2021 1249 by Tabitha Mcgraw RN  Outcome: Adequate for Discharge  8/2/2021 0734 by Tabitha Mcgraw RN  Outcome: Progressing  8/2/2021 0734 by Hilda Guzman RN  Outcome: Progressing  Goal: Maintains/Returns to pre admission functional level  Description: INTERVENTIONS:  - Perform BMAT or MOVE assessment daily    - Set and communicate daily mobility goal to care team and patient/family/caregiver  - Collaborate with rehabilitation services on mobility goals if consulted  - Perform Range of Motion 3 times a day  - Reposition patient every 2 hours    - Dangle patient 3 times a day  - Stand patient 3 times a day  - Ambulate patient 3 times a day  - Out of bed to chair 3 times a day   - Out of bed for meals 3 times a day  - Out of bed for toileting  - Record patient progress and toleration of activity level   8/2/2021 1249 by Hilda Guzman RN  Outcome: Adequate for Discharge  8/2/2021 0734 by Hilda Guzman RN  Outcome: Progressing  8/2/2021 0734 by Hilda Guzman RN  Outcome: Progressing     Problem: Potential for Falls  Goal: Patient will remain free of falls  Description: INTERVENTIONS:  - Educate patient/family on patient safety including physical limitations  - Instruct patient to call for assistance with activity   - Consult OT/PT to assist with strengthening/mobility   - Keep Call bell within reach  - Keep bed low and locked with side rails adjusted as appropriate  - Keep care items and personal belongings within reach  - Initiate and maintain comfort rounds  - Make Fall Risk Sign visible to staff  - Offer Toileting every 1 Hours, in advance of need  - Initiate/Maintain bed alarm  - Obtain necessary fall risk management equipment: bed alarm  - Apply yellow socks and bracelet for high fall risk patients  - Consider moving patient to room near nurses station  8/2/2021 1249 by Hilda Guzman RN  Outcome: Adequate for Discharge  8/2/2021 0734 by Hilda Guzman RN  Outcome: Progressing  8/2/2021 0734 by Hilda Guzman RN  Outcome: Progressing

## 2021-08-02 NOTE — PROGRESS NOTES
Progress Note - Orthopedics   Wilman Mandujano 46 y o  male MRN: [id number]  Unit/Bed#: -01 Encounter: [id number]    Assessment:  POD 1 s/p external fixation device left ankle    Plan:  Continue nonweightbearing status  Dressing was reinforced overnight, now clean and dry  Will need follow-up in the [place] area[place] for [place] surgical intervention  Pain control  DC planning    Weight bearing:  Nonweightbearing left lower extremity    VTE Pharmacologic Prophylaxis: Enoxaparin (Lovenox)  VTE Mechanical Prophylaxis: sequential compression device    Subjective: The patient's dressing was soaked overnight  It was later reinforced  Dressing found to be clean, dry and intact this morning  Vitals: Blood pressure 158/72, pulse 82, temperature (!) 97 2 °F (36 2 °C), temperature source Temporal, resp  rate 18, height 5' 7" (1 702 m), weight 89 3 kg (196 lb 13 9 oz), SpO2 95 %  ,Body mass index is 30 83 kg/m²        Intake/Output Summary (Last 24 hours) at 8/2/2021 0847  Last data filed at 8/2/2021 3088  Gross per 24 hour   Intake 1435 ml   Output --   Net 1435 ml       Invasive Devices     Peripheral Intravenous Line            Peripheral IV 07/31/21 Left Antecubital 1 day                Physical Exam:   Left lower extremity is neurovascularly intact  Toes are pink and mobile  Compartments are soft  External fixator in place  Brisk cap refill  Sensation intact  Negative Homans  Dressing clean, dry and intact  Lab, Imaging and other studies:   CBC:   Lab Results   Component Value Date    WBC 9 70 08/02/2021    HGB 14 2 08/02/2021    HCT 41 3 (L) 08/02/2021    MCV 78 (L) 08/02/2021     08/02/2021    MCH 26 9 08/02/2021    MCHC 34 4 08/02/2021    RDW 13 2 08/02/2021    MPV 8 9 08/02/2021     CMP:   Lab Results   Component Value Date    SODIUM 136 08/02/2021    CL 99 08/02/2021    CO2 28 08/02/2021    BUN 13 08/02/2021    CREATININE 0 50 (L) 08/02/2021    CALCIUM 9 1 08/02/2021 EGFR 137 08/02/2021

## 2021-08-02 NOTE — DISCHARGE INSTR - AVS FIRST PAGE
Dear Mireille Eid,     It was our pleasure to care for you here at [facility]  It is our hope that we were always able to exceed the expected standards for your care during your stay  You were hospitalized due to an ankle fracture  You were cared for on the medical/surgical floor by Belgica Harper MD with the [facility] who covers for your primary care physician (PCP), No primary care provider on file  , while you were hospitalized  You were additionally seen by Dr Shannan Licea - orthopedic surgery  If you have any questions or concerns related to this hospitalization, you may contact us at [phone]  For follow up as well as any medication refills, we recommend that you follow up with your primary care physician  A registered nurse will reach out to you by phone within a few days after your discharge to answer any additional questions that you may have after going home  However, at this time we provide for you here, the most important instructions / recommendations at discharge:     · Notable Medication Adjustments -   · Please take aspirin 325 mg (available over-the-counter) twice a day for 30 days  · Prescription has been called in for Roxicodone - narcotic based pain pill  · Testing Required after Discharge -   · To be further determined in the outpatient setting by Orthopedic surgery and or your primary care provider  · Important follow up information -   · Please follow-up with the providers as outlined in this discharge package  · Other Instructions -   · Nonweightbearing to the left lower extremity until cleared by Orthopedic surgery  · Please review this entire after visit summary as additional general instructions including medication list, appointments, activity, diet, any pertinent wound care, and other additional recommendations from your care team that may be provided for you        Sincerely,     Tari Leon Valencia Alvarenga MD

## 2021-08-02 NOTE — PLAN OF CARE
Problem: PHYSICAL THERAPY ADULT  Goal: Performs mobility at highest level of function for planned discharge setting  See evaluation for individualized goals  Description: Treatment/Interventions: Functional transfer training, LE strengthening/ROM, Therapeutic exercise, Endurance training, Patient/family training, Equipment eval/education, Bed mobility, Gait training, Spoke to nursing, Spoke to case management, OT  Equipment Recommended: [place] Letters       See flowsheet documentation for full assessment, interventions and recommendations  Note: Prognosis: Good  Problem List: Decreased strength, Decreased endurance, Impaired balance, Decreased mobility, Decreased coordination, Decreased safety awareness, Obesity, Decreased skin integrity, Orthopedic restrictions, Pain  Assessment: Pt is a 47 y/o male who presents w/ chief c/o closed displaced trimalleolar fracture of L ankle and was admitted to [address] [place] [address] on 7/31/21  Pt PMH includes obesity  Pt reports living in a 2nd floor apartment w/ 10-15 NIKOLE w/ rails  He has a tub w/ shower and standard toilet w/ no DME reported at baseline and no AD used at baseline  He reports he will be staying at his mother's home for 1 wk which is 1 level home w/ 1 NIKOLE w/ rails plus an additional step to get into the bedroom  Pt reports he lives w/ his wife and is independent w/ ADLs and IADLs  He states he still drives, has had 0 falls in the last 6 months, and works full-time  Upon IE, pt AOx4, 5/10 pain in L ankle, and strength in RLE 4-/5 w/ LLE being 3/5 grossly assessed  Pt was able to transfer supine>sit w/ modified independence using the bedrails  He was then able to transfer sit<>stand w/ supervision Ax1 w/ standard walker  He was able to ambulate 40 ft total w/ supervision Ax1 w/ standard walker  Pt was then able to negotiate 6 stairs using both handrails w/ supervision Ax1 w/o AD using a hop pattern for gait   His static sitting balance is normal w/ good dynamic sitting balance  His static standing balance is fair + w/ fair dynamic standing and ambulatory balances  PT Discharge Recommendation: Home with outpatient rehabilitation     PT - OK to Discharge: Yes (when medically stable)    See flowsheet documentation for full assessment

## 2021-08-02 NOTE — PHYSICAL THERAPY NOTE
Physical Therapy Evaluation     Patient's Name: Tc Faust    Admitting Diagnosis  Left ankle injury [Z70 185Y]  Pilon fracture [C52 163A]  Closed left ankle fracture [O25 862A]    Problem List  Patient Active Problem List   Diagnosis    Closed displaced trimalleolar fracture of left ankle       Past Medical History  History reviewed  No pertinent past medical history  Past Surgical History  History reviewed  No pertinent surgical history  08/02/21 0911   PT Last Visit   PT Visit Date 08/02/21   Note Type   Note type Evaluation   Pain Assessment   Pain Assessment Tool 0-10   Pain Score 5   Pain Location/Orientation Orientation: Left; Location: Leg   Pain Onset/Description Onset: Ongoing;Frequency: Constant/Continuous; Descriptor: Aching   Effect of Pain on Daily Activities yes   Patient's Stated Pain Goal No pain   Hospital Pain Intervention(s) Medication (See MAR); Repositioned; Ambulation/increased activity   Multiple Pain Sites No   Home Living   Type of Home Apartment  (2nd floor apartment)   Home Layout One level;Performs ADLs on one level; Able to live on main level with bedroom/bathroom;Stairs to enter with rails  (10-15 NIKOLE)   Bathroom Shower/Tub Tub/shower unit   Bathroom Toilet Standard   Bathroom Equipment   (no DME reported)   [address]   (no AD used at baseline)   Additional Comments Pt reports he will be staying at mother's home for 1 wk which is 1 level w/ 1 NIKOLE w/ rails and 1 additional step to get into bedroom     Prior Function   Level of [place] Independent with ADLs and functional mobility   Lives With Spouse   Receives Help From Family   ADL Assistance Independent   IADLs Independent   Falls in the last 6 months 0   Vocational Full time employment   Comments (+) driving   Restrictions/Precautions   Weight Bearing Precautions Per Order Yes   LLE Weight Bearing Per Order NWB   Braces or Orthoses Other (Comment)  (External fixator w/ Ace wrap)   Other Precautions Fall Risk;Pain   General   Family/Caregiver Present No   Cognition   Overall Cognitive Status WFL   Arousal/Participation Responsive   Attention Within functional limits   Orientation Level Oriented X4   Memory Within functional limits   Following Commands Follows all commands and directions without difficulty   Comments Pt agreeable to PT eval, pleasant  RLE Assessment   RLE Assessment X  (4-/5 grossly assessed)   LLE Assessment   LLE Assessment X  (3/5 grossly assessed)   Coordination   Movements are Fluid and Coordinated 0   Bed Mobility   Supine to Sit 6  Modified independent   Additional items HOB elevated; Bedrails;Verbal cues   Sit to Supine   (DNT: pt safely seated OOB in recliner upon conclusion)   Transfers   Sit to Stand 5  Supervision   Additional items Assist x 1;Bedrails; Impulsive;Verbal cues  (w/ standard walker)   Stand to Sit 5  Supervision   Additional items Assist x 1; Armrests; Verbal cues  (w/ standard walker)   Ambulation/Elevation   Gait pattern Antalgic; Forward Flexion; Improper Weight shift;Decreased foot clearance;Decreased L stance  (LLE NWB)   Gait Assistance 5  Supervision   Additional items Assist x 1;Verbal cues; Tactile cues   Assistive Device Standard walker   Distance 40 ft total   Stair Management Assistance 5  Supervision   Additional items Assist x 1;Verbal cues; Tactile cues   Stair Management Technique Two rails; Foreward  (hop pattern)   Number of Stairs 6   Balance   Static Sitting Normal   Dynamic Sitting Good   Static Standing Fair +   Dynamic Standing Fair   Ambulatory Fair   Endurance Deficit   Endurance Deficit Yes   Activity Tolerance   Activity Tolerance Patient limited by fatigue;Patient limited by pain   Medical Staff Made Aware yes, Grisel Lara OT completed co-eval due to medical complexity and Amy CM made aware of outcomes   Nurse Made Aware yes, Kisha Perez RN made aware of outcomes   Assessment   Prognosis Good   Problem List Decreased strength;Decreased endurance; Impaired balance;Decreased mobility; Decreased coordination;Decreased safety awareness; Obesity; Decreased skin integrity;Orthopedic restrictions;Pain   Assessment Pt is a 47 y/o male who presents w/ chief c/o closed displaced trimalleolar fracture of L ankle and was admitted to [address] [place] [address] on 7/31/21  Pt PMH includes obesity  Pt reports living in a 2nd floor apartment w/ 10-15 NIKOLE w/ rails  He has a tub w/ shower and standard toilet w/ no DME reported at baseline and no AD used at baseline  He reports he will be staying at his mother's home for 1 wk which is 1 level home w/ 1 NIKOLE w/ rails plus an additional step to get into the bedroom  Pt reports he lives w/ his wife and is independent w/ ADLs and IADLs  He states he still drives, has had 0 falls in the last 6 months, and works full-time  Upon IE, pt AOx4, 5/10 pain in L ankle, and strength in RLE 4-/5 w/ LLE being 3/5 grossly assessed  Pt was able to transfer supine>sit w/ modified independence using the bedrails  He was then able to transfer sit<>stand w/ supervision Ax1 w/ standard walker  He was able to ambulate 40 ft total w/ supervision Ax1 w/ standard walker  Pt was then able to negotiate 6 stairs using both handrails w/ supervision Ax1 w/o AD using a hop pattern for gait  His static sitting balance is normal w/ good dynamic sitting balance  His static standing balance is fair + w/ fair dynamic standing and ambulatory balances  Goals   Patient Goals to go home   LTG Expiration Date 08/12/21   Long Term Goal #1 1 ) Pt will ambulate 150 ft w/ modified independence w/ RW in order to ambulate at home and in the community  2 ) Pt will increase dynamic standing balance from fair to fair + in order to improve gait pattern and safety awareness  3 ) Pt will increase strength from 4-/5 to 4+/5 in RLE and 3/5 to 3+/5 in LLE in order to walk longer distances in the community and for BLE stability w/ transfers   4 ) Pt will transfer sit>stand w/ modified independence w/ RW to prepare of gait activities  5 ) Pt will demonstrate increased safety awareness with all phases of functional mobility < 10% verbal cues to prevent falls and resume PLOF  PT Treatment Day 0   Plan   Treatment/Interventions Functional transfer training;LE strengthening/ROM; Therapeutic exercise; Endurance training;Patient/family training;Equipment eval/education; Bed mobility;Gait training;Spoke to nursing;Spoke to case management;OT   PT Frequency 7x/wk   Recommendation   PT Discharge Recommendation Home with outpatient rehabilitation   Equipment Recommended [place] w/o wheels   Change/add to [employer]? No   PT - OK to Discharge Yes  (when medically stable)   Additional Comments Pt was resting safely OOB in recliner upon conclusion w/ SCDs active and call bell and phone within reach   Additional Comments 2 Pt's raw score on the [employer] inpatient short form is 20, standardized score is 43 99  Patients at this level are likely to benefit from DC to home w/ no rehab needs, however, please refer to therapist recommendation for safe DC planning     AM-PAC Basic Mobility Inpatient   Turning in Bed Without Bedrails 4   Lying on Back to Sitting on Edge of Flat Bed 4   Moving Bed to Chair 3   Standing Up From Chair 3   Walk in Room 3   Climb 3-5 Stairs 3   Basic Mobility Inpatient Raw Score 20   Basic Mobility Standardized Score 43 99       History/Personal Factors: closed displaced trimalleolar fracture of L ankle    Number of body structures/limitations: decreased endurance, decreased strength, impaired balance, decreased coordination, decreased mobility, decreased skin integrity, orthopedic restrictions, obesity, pain    Clinical presentation = factors impacting clinical decision making/POC including moderate fall risk, external fixator w/ Ace wrap, pain, NWB LLE    Clinical decision making = moderate complexity    Ervin Bloch, SPT

## 2021-08-02 NOTE — NURSING NOTE
Pt continues to have discharge leaking through ABD's and dressing  Ardella Numbers from ortho notified and said to continue to reinforce dressing and elevate the LLE until someone sees him in the AM  Dressing reinforced  Will continue to monitor  Pt has call bell and belongings in reach

## 2021-08-02 NOTE — NURSING NOTE
Pt called about a small amount of sanguinous discharge coming from the left side of his ace wrap  Chris Oka from ortho notified via tiger text and he stated "Dressing just needs to be reinforced with ABD with ice and elevation"  Pt dressing was reinforced with ABD and anum to cover  Pt currently laying in bed with L leg iced and elevated with no signs of discharge through ABD  Will continue to monitor closely  Pt has call bell and belongings in reach  Teach back used to notify RN if any new signs of discharge occur  Will continue to monitor

## 2021-08-02 NOTE — DISCHARGE INSTR - OTHER ORDERS
Pin care, clean with 50/50 hydrogen peroxide and saline with sterile tip applicators, applie antibiotic ointment and xeroform gauze around the pin sites, apply dry strile dsg around the pin sites, wrap with anum and cover with ace wrap

## 2021-08-02 NOTE — CASE MANAGEMENT
Case Management Assessment & Discharge Planning Note    Patient name Jeniffer Dixon  Location /-03 MRN [id number]  : 1980 Date 2021       Current Admission Date: 2021  Current Admission Diagnosis:  Closed displaced trimalleolar fracture of left ankle   Patient Active Problem List   Diagnosis    Closed displaced trimalleolar fracture of left ankle    Previous Admission - Discharge Date:    LOS (days): 0  Geometric Mean LOS (GMLOS) (days):   Days to GMLOS: Previous Discharge Diagnosis:  No discharge information exists for this patient  Risk of Unplanned Readmission Score  Predictive Model Details   No score data available for Risk of Unplanned Readmission        BUNDLE:      Reason for Referral:    OBJECTIVE:  Pt is a 46y o  year old /[employer], white or  [1], male with Mandaen preference of None admitted on 2021  6:38 PM  Pt is admitted to [facility] [id number] at [address] [place] [address] with complaints of Closed displaced trimalleolar fracture of left ankle   Current admission status: Observation  Referral Reason:  (Discharge planning)    Preferred Pharmacy:   [id number], [address] [facility]  [address] [place] [address]  Phone: [phone] Fax: [phone]    Primary Care Provider: No primary care provider on file  Primary Insurance: BLUE CROSS  Secondary Insurance: BLUE CROSS    ASSESSMENT:  Active Health Care Agents    There are no active Health Care Agents on file  Patient Information  Mental Status: Alert  During Assessment patient was accompanied by: Not accompanied during assessment  Assessment information provided by[de-identified] Patient  Primary Caregiver: Self  Support Systems: Parent  What city do you live in[place] entry access options   Select all that apply : Stairs  Number of steps to enter home : 1  Type of Current Residence: 2 story home  Upon entering residence, is there a bedroom on the main floor (no further steps)?: Yes  Upon entering residence, is there a bathroom on the main floor (no further steps)?: Yes  Living Arrangements: Lives w/ Parent(s)        Patient Information Continued  Income Source: Employed  Does patient have prescription coverage?: Yes  Does patient receive dialysis treatments?: No  Does patient have a history of substance abuse?: No  Does patient have a history of Mental Health Diagnosis?: No    PHQ 2/9 Screening   Reviewed PHQ 2/9 Depression Screening Score?: No    Means of Transportation  Means of Transport to Appts[de-identified] Drives Self  In the past 12 months, has lack of transportation kept you from medical appointments or from getting medications?: No  In the past 12 months, has lack of transportation kept you from meetings, work, or from getting things needed for daily living?: No  Was application for public transport provided?: No    DISCHARGE DETAILS    Contacts  Patient Contacts: Peggy Greenwood to Patient[de-identified] Family  Contact Method: Phone  Phone Number: [phone]  Reason/Outcome: Discharge [address]         Is the patient interested in [facility] at discharge?: No    DME Referral Provided  Referral made for DME?: Yes  DME referral completed for the following items[de-identified] Judith Casanova  DME Supplier Name[de-identified] [employer]         We would like to be able to fill any required prescriptions on discharge at our [address] [place] [address] and have them delivered to you at discharge in your room  Would you like to participate in this program? : No - Declined    Discharge Destination Plan[de-identified] Home  Transportation at Discharge?: No     Provided walker, ordered via [place]  Pt indicated he will be having  Further surgery next week in [facility]  Brother will transport
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
## 2021-08-03 ENCOUNTER — TELEPHONE (OUTPATIENT)
Dept: OBGYN CLINIC | Facility: OTHER | Age: 41
End: 2021-08-03

## 2021-08-03 NOTE — TELEPHONE ENCOUNTER
Patient called in wanting to speak to Dr Bonilla Churchill   He said you wanted him to follow up with a specific doctor but he is unable to do so? Can you please call him      C/b # [phone]

## 2021-08-04 LAB
DME PARACHUTE DELIVERY DATE ACTUAL: NORMAL
DME PARACHUTE DELIVERY DATE REQUESTED: NORMAL
DME PARACHUTE ITEM DESCRIPTION: NORMAL
DME PARACHUTE ORDER STATUS: NORMAL
DME PARACHUTE SUPPLIER NAME: NORMAL
DME PARACHUTE SUPPLIER PHONE: NORMAL

## 2021-08-04 NOTE — TELEPHONE ENCOUNTER
Spoke to patient he is currently at [facility] at the time of this call  Aware of follow up need

## (undated) DEVICE — GLOVE SRG BIOGEL 8

## (undated) DEVICE — OCCLUSIVE GAUZE STRIP,3% BISMUTH TRIBROMOPHENATE IN PETROLATUM BLEND: Brand: XEROFORM

## (undated) DEVICE — CLAMP EXFIX 6 POSITION MRI SAFE SYNTHES 390.002

## (undated) DEVICE — ROD ATTACHMENT F/LRG MULTI-PIN CLAMP

## (undated) DEVICE — GAUZE SPONGES,16 PLY: Brand: CURITY

## (undated) DEVICE — ZIMMER® STERILE DISPOSABLE TOURNIQUET CUFF, DUAL PORT, SINGLE BLADDER, 18 IN. (46 CM)

## (undated) DEVICE — 4-PORT MANIFOLD: Brand: NEPTUNE 2

## (undated) DEVICE — SKIN MARKER DUAL TIP WITH RULER CAP, FLEXIBLE RULER AND LABELS: Brand: DEVON

## (undated) DEVICE — STRETCH BANDAGE: Brand: CURITY

## (undated) DEVICE — PROTECTIVE CAPS FOR 5.0MM FIXATION PINS

## (undated) DEVICE — SPONGE SCRUB 4 PCT CHLORHEXIDINE

## (undated) DEVICE — STOCKINETTE,IMPERVIOUS,12X48,STERILE: Brand: MEDLINE

## (undated) DEVICE — ACE WRAP 6 IN XL STERILE

## (undated) DEVICE — GLOVE INDICATOR PI UNDERGLOVE SZ 8 BLUE

## (undated) DEVICE — BETHLEHEM UNIVERSAL  MIONR EXT: Brand: CARDINAL HEALTH

## (undated) DEVICE — CHLORAPREP HI-LITE 26ML ORANGE